# Patient Record
Sex: FEMALE | Race: WHITE | NOT HISPANIC OR LATINO | Employment: FULL TIME | URBAN - METROPOLITAN AREA
[De-identification: names, ages, dates, MRNs, and addresses within clinical notes are randomized per-mention and may not be internally consistent; named-entity substitution may affect disease eponyms.]

---
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
## 2021-02-01 NOTE — OP NOTE
Surgeon / Clinician: Rafal More MD    PREOPERATIVE DIAGNOSIS:  Bilateral symptomatic macromastia.    POSTOPERATIVE DIAGNOSIS:  Bilateral symptomatic macromastia.    PROCEDURE:  Bilateral breast reduction.    SURGEON:  Rafal More MD.    ANESTHESIA:  General.    COMPLICATIONS:  None.    INDICATION FOR PROCEDURE:  The patient is an 17-year-old female with chronic back, neck and shoulder discomfort secondary to large breasts.  She desires a bilateral reduction mammaplasty.  I discussed with her preoperatively the risks which include but are not exclusive to bleeding, infection, nipple sensitivity changes, asymmetry, wound healing complications, abnormal pathology, dissatisfaction with final size and shape and scarring.  She understands this and wishes to proceed.    PROCEDURE:  The patient was marked with the standard Zazueta pattern.  She was taken to the operating room and placed supine.  After a smooth induction of general anesthesia, she was prepped and draped sterilely.  I began on the right-hand side.  I injected 0.25% Marcaine with epinephrine along all incisions.  All incisions were then scored.  A medial-based pedicle is developed by de-epithelization and dissection to chest wall leaving a broad vascular base.  The appropriate tissue inferolaterally and superiorly was resected.  Hemostasis was achieved with electrocautery.  A drain was placed in the bed and brought out through the incision and sutured in place.  The skin flaps were rotated together and the pedicle was rotated into position and all were held in place with interrupted 2-0 Vicryl's, interrupted 3-0 Monocryl's.  Total weight resected is 1070 grams.  A mirror procedure was done contralaterally on the left breast, which is the slightly smaller breast and this weighed 920 grams.  She had excellent shape and symmetry.  Final closure was performed with 3-0 and 4-0 Monocryl sutures and Steri-strips were placed.  A compressive garnet was  placed.  She was awakened from anesthesia and taken to recovery in good condition.    ESTIMATED BLOOD LOSS:  75 mL.    COMPLICATIONS:  None.        Rafal More MD    D:  07/11/2017 15:27 T:  07/13/2017 21:31  Document:  [id number] MP\TD   Josh

## 2025-05-13 ENCOUNTER — OFFICE VISIT (OUTPATIENT)
Dept: URGENT CARE | Facility: URGENT CARE | Age: 25
End: 2025-05-13

## 2025-05-13 ENCOUNTER — HOSPITAL ENCOUNTER (EMERGENCY)
Facility: CLINIC | Age: 25
Discharge: HOME | End: 2025-05-13
Attending: STUDENT IN AN ORGANIZED HEALTH CARE EDUCATION/TRAINING PROGRAM | Admitting: STUDENT IN AN ORGANIZED HEALTH CARE EDUCATION/TRAINING PROGRAM
Payer: COMMERCIAL

## 2025-05-13 ENCOUNTER — APPOINTMENT (OUTPATIENT)
Dept: CT IMAGING | Facility: CLINIC | Age: 25
End: 2025-05-13
Attending: STUDENT IN AN ORGANIZED HEALTH CARE EDUCATION/TRAINING PROGRAM
Payer: COMMERCIAL

## 2025-05-13 VITALS
HEART RATE: 104 BPM | HEIGHT: 68 IN | DIASTOLIC BLOOD PRESSURE: 83 MMHG | RESPIRATION RATE: 23 BRPM | WEIGHT: 196 LBS | OXYGEN SATURATION: 98 % | TEMPERATURE: 97.3 F | BODY MASS INDEX: 29.7 KG/M2 | SYSTOLIC BLOOD PRESSURE: 116 MMHG

## 2025-05-13 VITALS
BODY MASS INDEX: 29.7 KG/M2 | HEART RATE: 88 BPM | RESPIRATION RATE: 17 BRPM | SYSTOLIC BLOOD PRESSURE: 113 MMHG | WEIGHT: 196 LBS | DIASTOLIC BLOOD PRESSURE: 81 MMHG | HEIGHT: 68 IN | TEMPERATURE: 97.3 F | OXYGEN SATURATION: 99 %

## 2025-05-13 DIAGNOSIS — R51.9 ACUTE NONINTRACTABLE HEADACHE, UNSPECIFIED HEADACHE TYPE: ICD-10-CM

## 2025-05-13 DIAGNOSIS — R55 TRANSIENT LOSS OF CONSCIOUSNESS: ICD-10-CM

## 2025-05-13 DIAGNOSIS — R56.9 SEIZURE-LIKE ACTIVITY (H): ICD-10-CM

## 2025-05-13 DIAGNOSIS — R11.2 NAUSEA AND VOMITING, UNSPECIFIED VOMITING TYPE: ICD-10-CM

## 2025-05-13 DIAGNOSIS — R55 SYNCOPE, UNSPECIFIED SYNCOPE TYPE: Primary | ICD-10-CM

## 2025-05-13 DIAGNOSIS — R42 LIGHTHEADEDNESS: ICD-10-CM

## 2025-05-13 LAB
ALBUMIN SERPL BCG-MCNC: 4.5 G/DL (ref 3.5–5.2)
ALBUMIN UR-MCNC: 50 MG/DL
ALP SERPL-CCNC: 60 U/L (ref 40–150)
ALT SERPL W P-5'-P-CCNC: 19 U/L (ref 0–50)
AMORPH CRY #/AREA URNS HPF: ABNORMAL /HPF
ANION GAP SERPL CALCULATED.3IONS-SCNC: 13 MMOL/L (ref 7–15)
APPEARANCE UR: ABNORMAL
AST SERPL W P-5'-P-CCNC: 21 U/L (ref 0–45)
BACTERIA #/AREA URNS HPF: ABNORMAL /HPF
BASOPHILS # BLD AUTO: 0.1 10E3/UL (ref 0–0.2)
BASOPHILS NFR BLD AUTO: 0 %
BILIRUB SERPL-MCNC: 0.5 MG/DL
BILIRUB UR QL STRIP: NEGATIVE
BUN SERPL-MCNC: 9.7 MG/DL (ref 6–20)
CALCIUM SERPL-MCNC: 8.9 MG/DL (ref 8.8–10.4)
CHLORIDE SERPL-SCNC: 104 MMOL/L (ref 98–107)
COLOR UR AUTO: YELLOW
CREAT SERPL-MCNC: 0.92 MG/DL (ref 0.51–0.95)
EGFRCR SERPLBLD CKD-EPI 2021: 88 ML/MIN/1.73M2
EOSINOPHIL # BLD AUTO: 0.1 10E3/UL (ref 0–0.7)
EOSINOPHIL NFR BLD AUTO: 1 %
ERYTHROCYTE [DISTWIDTH] IN BLOOD BY AUTOMATED COUNT: 12.5 % (ref 10–15)
GLUCOSE SERPL-MCNC: 90 MG/DL (ref 70–99)
GLUCOSE UR STRIP-MCNC: NEGATIVE MG/DL
HCG SERPL QL: NEGATIVE
HCO3 SERPL-SCNC: 20 MMOL/L (ref 22–29)
HCT VFR BLD AUTO: 39.9 % (ref 35–47)
HGB BLD-MCNC: 14.1 G/DL (ref 11.7–15.7)
HGB UR QL STRIP: ABNORMAL
HYALINE CASTS: 12 /LPF
IMM GRANULOCYTES # BLD: 0 10E3/UL
IMM GRANULOCYTES NFR BLD: 0 %
KETONES UR STRIP-MCNC: 20 MG/DL
LEUKOCYTE ESTERASE UR QL STRIP: NEGATIVE
LYMPHOCYTES # BLD AUTO: 1.4 10E3/UL (ref 0.8–5.3)
LYMPHOCYTES NFR BLD AUTO: 12 %
MCH RBC QN AUTO: 29.7 PG (ref 26.5–33)
MCHC RBC AUTO-ENTMCNC: 35.3 G/DL (ref 31.5–36.5)
MCV RBC AUTO: 84 FL (ref 78–100)
MONOCYTES # BLD AUTO: 0.4 10E3/UL (ref 0–1.3)
MONOCYTES NFR BLD AUTO: 3 %
MUCOUS THREADS #/AREA URNS LPF: PRESENT /LPF
NEUTROPHILS # BLD AUTO: 9.8 10E3/UL (ref 1.6–8.3)
NEUTROPHILS NFR BLD AUTO: 83 %
NITRATE UR QL: NEGATIVE
NRBC # BLD AUTO: 0 10E3/UL
NRBC BLD AUTO-RTO: 0 /100
PH UR STRIP: 5.5 [PH] (ref 5–7)
PLATELET # BLD AUTO: 289 10E3/UL (ref 150–450)
POTASSIUM SERPL-SCNC: 3.8 MMOL/L (ref 3.4–5.3)
PROT SERPL-MCNC: 7.4 G/DL (ref 6.4–8.3)
RBC # BLD AUTO: 4.75 10E6/UL (ref 3.8–5.2)
RBC URINE: 2 /HPF
SODIUM SERPL-SCNC: 137 MMOL/L (ref 135–145)
SP GR UR STRIP: 1.02 (ref 1–1.03)
SQUAMOUS EPITHELIAL: <1 /HPF
UROBILINOGEN UR STRIP-MCNC: NORMAL MG/DL
WBC # BLD AUTO: 11.8 10E3/UL (ref 4–11)
WBC URINE: 2 /HPF

## 2025-05-13 PROCEDURE — 99204 OFFICE O/P NEW MOD 45 MIN: CPT | Performed by: PHYSICIAN ASSISTANT

## 2025-05-13 PROCEDURE — 3074F SYST BP LT 130 MM HG: CPT | Performed by: PHYSICIAN ASSISTANT

## 2025-05-13 PROCEDURE — 93000 ELECTROCARDIOGRAM COMPLETE: CPT | Performed by: PHYSICIAN ASSISTANT

## 2025-05-13 PROCEDURE — 84703 CHORIONIC GONADOTROPIN ASSAY: CPT | Performed by: STUDENT IN AN ORGANIZED HEALTH CARE EDUCATION/TRAINING PROGRAM

## 2025-05-13 PROCEDURE — 81001 URINALYSIS AUTO W/SCOPE: CPT | Performed by: STUDENT IN AN ORGANIZED HEALTH CARE EDUCATION/TRAINING PROGRAM

## 2025-05-13 PROCEDURE — 85025 COMPLETE CBC W/AUTO DIFF WBC: CPT | Performed by: STUDENT IN AN ORGANIZED HEALTH CARE EDUCATION/TRAINING PROGRAM

## 2025-05-13 PROCEDURE — 3079F DIAST BP 80-89 MM HG: CPT | Performed by: PHYSICIAN ASSISTANT

## 2025-05-13 PROCEDURE — 99284 EMERGENCY DEPT VISIT MOD MDM: CPT | Mod: 25

## 2025-05-13 PROCEDURE — 82247 BILIRUBIN TOTAL: CPT | Performed by: STUDENT IN AN ORGANIZED HEALTH CARE EDUCATION/TRAINING PROGRAM

## 2025-05-13 PROCEDURE — 36415 COLL VENOUS BLD VENIPUNCTURE: CPT | Performed by: STUDENT IN AN ORGANIZED HEALTH CARE EDUCATION/TRAINING PROGRAM

## 2025-05-13 PROCEDURE — 70450 CT HEAD/BRAIN W/O DYE: CPT

## 2025-05-13 RX ORDER — DESOGESTREL AND ETHINYL ESTRADIOL 0.15-0.03
1 KIT ORAL DAILY
COMMUNITY
Start: 2025-05-13

## 2025-05-13 RX ORDER — ALPRAZOLAM 2 MG/1
2 TABLET ORAL 3 TIMES DAILY PRN
COMMUNITY

## 2025-05-13 ASSESSMENT — COLUMBIA-SUICIDE SEVERITY RATING SCALE - C-SSRS
2. HAVE YOU ACTUALLY HAD ANY THOUGHTS OF KILLING YOURSELF IN THE PAST MONTH?: NO
6. HAVE YOU EVER DONE ANYTHING, STARTED TO DO ANYTHING, OR PREPARED TO DO ANYTHING TO END YOUR LIFE?: NO
1. IN THE PAST MONTH, HAVE YOU WISHED YOU WERE DEAD OR WISHED YOU COULD GO TO SLEEP AND NOT WAKE UP?: NO

## 2025-05-13 NOTE — PROGRESS NOTES
"  ASSESSMENT/PLAN:    (R55) Syncope, unspecified syncope type  (primary encounter diagnosis)    MDM Unwitnessed syncopal , estimated to be 10 to 15 minutes in total duration by patient--followed by nausea, cyclical non-bloody vomiting, generalized headache, lightheadedness (without vertigo), and  \"brain fog\"/feels cognitively \"slower\" of uncertain etiology.  As her event was unwitnessed, seizure cannot be excluded.  Other emergent neurologic, cardiac etiologies are possible.  Electrolyte disturbances are in the differential.  Patient was alert and in no acute distress during her short urgent care visit.  ECG showed normal sinus rhythm 98 bpm.  No evidence of acute ischemic changes or ectopy on my impression.  No focal neurologic deficits on exam. I advised a rapid glucose and hemoglobin check on arrival.  Patient declined lab testing here/states she has needle phobia.    Patient is advised she should be seen in the emergency department now for further evaluation.  She verbalizes understanding of and agrees to this plan.  Patient does have an adult  to take her directly from the urgent care to an emergency department.  Please also see the below discharge summary/plan (which I reviewed with patient verbally today and provided in printed form to hand carry to the emergency department to assist in continuity of care).    Plan: EKG 12-lead complete w/read - Clinics            AVS/Plan-    May 13, 2025 Urgent Care Plan:       I have advised you have your adult  take you directly from our urgent care to the emergency room now for further evaluation of your unwitnessed syncope (fainting episode) that you had at work today, lasting an estimated 10-15 minutes by your report (approximately 2 PM).--Followed by cyclical vomiting, headache, brain fog, lightheadedness and tiredness.      You should be wheeled from your car to the emergency department door.  Please do not attempt to walk while you are feeling this way.  " "    I provided a copy of your heart tracing (also called the ECG) for you to hand carry to the emergency room now.    As per your desire/because you do not like needles, no lab testing was done during her short urgent care visit.    (R51.9) Acute nonintractable headache, unspecified headache type  Plan: EKG 12-lead complete w/read - Clinics            (R11.2) Nausea and vomiting, unspecified vomiting type  Plan: EKG 12-lead complete w/read - Clinics            (R42) Lightheadedness  Plan: EKG 12-lead complete w/read - Clinics         This progress note has been dictated, with use of voice recognition software. Any grammatical, typographical, or context errors are unintentional and inherent to use of voice recognition software.  -------------    Chief Complaint   Patient presents with    Urgent Care     Passed out at work today-did not hit head (unsure) R jaw pain is worse than L       SUBJECTIVE:     Karissa Amato is a 25 year old female who presents to  today for evaluation of unexplained syncopal event at work today.    HPI: Patient tells me she was sitting at her office desk, behind a closed office door at approximately 2 PM today when she fainted.  Patient states, \"I do not remember what happened.  I was just sitting at my office desk and then I woke up on the floor.\"  Patient estimated the length of her loss of consciousness was approximately 10 or 15 minutes.  When she woke, she noted generalized headache, nausea and vomiting.  She vomited once at work right after the above episode and had several  episodes of vomiting during her short urgent care visit (no black or bloody vomitus).     Patient states nothing unusual occurred at work today/states she did not have any extra stress or anxiety at work today.     Associated symptoms-patient feels generally tired, lightheaded (no vertigo) and has some \"brain fog\" sensation/feels cognitively slow since the above episode.    No associated chest pain, shortness of " breath,  hemoptysis, abdominal pain or known seizure activity (although her syncopal episode was unwitnessed).      No personal history of seizures,brain aneurysm, AAA, cardiac arrhythmias or other cardiac history. No prior syncopal episodes.                     Review Of Systems  Consitutional: No fever or chills   Skin: No acute rash or hives    Eyes: Denies any visual changes   Respiratory: Denies any hx of spontaneous pneumothorax No shortness of breath, dyspnea on exertion, cough, or hemoptysis  Cardiovascular: No acute chest pain. No personal or primary family history of cardiac arrhythmias. No family history of SCD. No palpitations, tachycardia, irregular heart beat, or dyspnea on exertion  Gastrointestinal: Positive for acute nausea and cyclical non-bloody vomiting after above syncopal event. No acute abdominal pain. No known history of AAA   Genitourinary: Denies any dysuria or UTI symptoms   Neurologic: Positive for generalized headache after above syncopal event. No headache prior to syncopal event.  No seizures after patient woke from syncopal episode.  No associated neck stiffness, photophobia, vertigo, speech changes, visual changes, upper extremity or lower extremity weakness.  GYN: Patient states she takes continues dose OCP-with break to cycle about every 3 months. LMP was reportedly at expected interval. She denies any concern for pregnancy. No pelvic pain. No vaginal bleeding.   Psychiatric: No increased stress or panic attack symptoms prior  to above syncopal event   Hematologic/Lymphatic/Immunologic: Denies any personal hx of DVT/PE or blood dyscrasia   Endocrine: Denies any DM         Past Medical History:   Diagnosis Date    Large breasts      There is no problem list on file for this patient.      Current Outpatient Medications   Medication Sig Dispense Refill    ALPRAZolam (XANAX) 2 MG tablet Take 2 mg by mouth 3 times daily as needed for anxiety (medical phobia).      desogestrel-ethinyl  "estradiol (APRI) 0.15-30 MG-MCG tablet Take 1 tablet by mouth daily.       No current facility-administered medications for this visit.         Allergies   Allergen Reactions    Propranolol Hcl Shortness Of Breath       OBJECTIVE:  /83   Pulse 104   Temp 97.3  F (36.3  C) (Tympanic)   Resp 23   Ht 1.727 m (5' 8\")   Wt 88.9 kg (196 lb)   LMP 03/24/2025 (Approximate)   SpO2 98%   BMI 29.80 kg/m        General appearance: alert and no apparent distress  Skin color is uniform in color and without systemic rash or hives  HEENT:   Head is atraumatic   Conjunctiva not injected.  Sclera clear.  Oropharyngeal exam: Patient states she has jaw tenderness, bilaterally, on exam. She is able to open and close mouth.  No tongue, mouth, or throat swelling. no lesions, erythema, NECK: Trachea is midline  CARDIAC:NORMAL - regular rate and rhythm without murmur.  RESP:  Normal - CTA without rales, rhonchi, or wheezing. Good breath sounds into all listening areas today   ABDOMEN: Abdomen soft, non-tender. BS normal. No masses, organomegaly. No CVA tenderness  EXTREMITIES:  No asymmetry. Lower legs are equally symmetrical bilaterally. No redness, swelling, heat or pain of lower extremities.   NEURO:  PERRLA.  Alert and oriented.  Undilated fundoscopic exam within normal limits. CN II/XII grossly intact. UE/LE strength, DTR's and sensation to light touch good and equal bilaterally.  Normal finger-to-nose testing.         Today's ECG:  normal sinus rhythm 98 bpm.  No evidence of acute ischemic change on my impression.  No ectopy.                                "

## 2025-05-13 NOTE — ED TRIAGE NOTES
Pt referred to this ED from  after a syncopal episode. Pt was at work today at her desk and lost consciousness for 10-15 minutes, regained consciousness on the floor. Since then has been experiencing vomiting, brain fog, lightheadedness, and tiredness. States she took 2mg of xanax pta because she is afraid of needles.     Triage Assessment (Adult)       Row Name 05/13/25 4010          Triage Assessment    Airway WDL WDL        Respiratory WDL    Respiratory WDL WDL        Skin Circulation/Temperature WDL    Skin Circulation/Temperature WDL WDL        Cardiac WDL    Cardiac WDL WDL        Peripheral/Neurovascular WDL    Peripheral Neurovascular WDL WDL        Cognitive/Neuro/Behavioral WDL    Cognitive/Neuro/Behavioral WDL WDL

## 2025-05-13 NOTE — PROGRESS NOTES
Urgent Care Clinic Visit  Rapid rooming was initiated.  Provider was consulted, patient will remain in room and provider will be with patient as soon as possible.  Chief Complaint   Patient presents with    Urgent Care     Passed out at work today-did not hit head (unsure) R jaw pain is worse than L               5/13/2025     3:36 PM   Additional Questions   Roomed by Roberta   Accompanied by Aleah (co-worker)

## 2025-05-13 NOTE — PATIENT INSTRUCTIONS
May 13, 2025 Urgent Care Plan:       I have advised you have your adult  take you directly from our urgent care to the emergency room now for further evaluation of your unwitnessed syncope (fainting episode) that you had at work today, lasting an estimated 10-15 minutes by your report (approximately 2 PM).--Followed by cyclical vomiting, headache, brain fog, lightheadedness and tiredness.      You should be wheeled from your car to the emergency department door.  Please do not attempt to walk while you are feeling this way.      I provided a copy of your heart tracing (also called the ECG) for you to hand carry to the emergency room now.    As per your desire/because you do not like needles, no lab testing was done during her short urgent care visit.

## 2025-05-14 ENCOUNTER — PATIENT OUTREACH (OUTPATIENT)
Dept: CARE COORDINATION | Facility: CLINIC | Age: 25
End: 2025-05-14
Payer: COMMERCIAL

## 2025-05-14 NOTE — DISCHARGE INSTRUCTIONS
Exactly what happened earlier today is not entirely clear.  It is possible you had a fainting episode or perhaps a seizure.  The workup here in the emergency department today was overall reassuring.  Your CT scan did not show any abnormalities in your head or brain and your blood work was also reassuring.  I would continue to monitor for symptoms and if you have any recurrent loss of consciousness particularly if it is associated with repetitive rhythmic jerking of the arms or legs and a prolonged period of confusion afterwards this would be more consistent with a seizure and a reason to have a repeat evaluation in the emergency department.  Otherwise I did place a referral for neurology.  They will usually contact you in a few days to schedule a follow-up appointment.

## 2025-05-14 NOTE — ED PROVIDER NOTES
EMERGENCY DEPARTMENT ENCOUNTER      NAME: Karissa Amato  AGE: 25 year old female  YOB: 2000  MRN: [id number]  EVALUATION DATE & TIME: No admission date for patient encounter.    PCP: Ebony Craig    ED PROVIDER: Thomas Renteria MD      Chief Complaint   Patient presents with    Loss of Consciousness         FINAL IMPRESSION:  1. Transient loss of consciousness    2. Seizure-like activity (H)          ED COURSE & MEDICAL DECISION MAKING:    Pertinent Labs & Imaging studies reviewed. (See chart for details)  25 year old female presents to the Emergency Department for evaluation of loss of consciousness    ED Course as of 05/13/25 2238   Tue May 13, 2025   2103 Patient is a 25-year-old female with history of anxiety currently on bupropion and alprazolam who presents emergency department for evaluation of transient loss of consciousness.  Patient was at work today when she woke up on the ground is not really certain what happened.  Thinks she may have been on the ground for 10 to 15 minutes.  No prodromal symptoms.   remember any presyncope/loss of consciousness chest pain, shortness of breath or headache.  She woke up on the ground with little bit of a headache which is since resolved.  No known history of seizure disorder.  No family history.  Denies any chest pain currently.    Exam patient is well-appearing no acute distress.  Hemodynamically stable and afebrile.  Saturating well room air.  She is awake alert and oriented answers questions appropriately.  Speech, no facial droop, symmetric strength in upper lower extremities bilaterally, no meningismus.  No midline C-spine tenderness on exam.  Unclear etiology of her loss of consciousness.  Suspicious for possible syncope versus seizure activity.      Given what patient endorses as about a 10 to 15-minute loss of consciousness more suspicious for possible seizure and postictal period although he did not witnesses to the event.  She denies tongue  biting or loss of control or bowel or bladder.  Will obtain EKG, basic metabolic workup and CT of the head.       2104 Labs reviewed inter myself.  CBC shows slight leukocytosis but no significant anemia.  Reassuring electrolytes.  UA shows few bacteria but no nitrites and patient is denying any urinary symptoms.  Urine pregnancy negative   2106 Im able to see EKG obtained at urgent care today which shows a sinus rhythm 98 beats minute, normal axis, normal [place], QRS and QTc durations, no ST elevations, no concerning interval prolongation there is signs of Brugada.   2207 Head CT does not show any acute intracranial abnormality.   2232 Upon repeat evaluation patient remains awake alert and oriented and interacting appropriately.  Exactly what happened earlier today remains unclear as this was not witnessed.  Could potentially be a seizure although there is nothing definitively diagnostic.  Discussed with patient plan for outpatient follow-up with neurology.  She is currently on Wellbutrin however without any way to definitively diagnose seizure today I think it is reasonable for her to continue this as she has been on it for several years without any issue.  Recommend she continue to closely monitor symptoms and if she has any recurrent loss of consciousness she should have a low threshold for returning to the emergency department.  Otherwise discussed with her that she should avoid potentially risky activities if she were to have another transient loss of consciousness such as swimming alone.  Patient expresses understanding and otherwise feels comfortable with discharge home.  Discharged stable condition.     8:54 PM I met and evaluated the patient.         Medical Decision Making    Discharge. No recommendations on prescription strength medication(s). I considered admission, but discharged patient after significant clinical improvement.    MIPS (CTPE, Dental pain, Mishra, Sinusitis, Asthma/COPD, Head Trauma): Not  Applicable    SEPSIS:            At the conclusion of the encounter I discussed the results of all of the tests and the disposition. The questions were answered. The patient or family acknowledged understanding and was agreeable with the care plan.     0 minutes of critical care time     MEDICATIONS GIVEN IN THE EMERGENCY:  Medications - No data to display    NEW PRESCRIPTIONS STARTED AT TODAY'S ER VISIT  New Prescriptions    No medications on file          =================================================================    HPI    Patient information was obtained from: patient and family.     Use of : N/A         Karissa Amato is a 25 year old female with a pertinent history of asthma, ARLIN, who presents to this ED by walk-in for evaluation of syncope.     Patient presents to the ED for concerns of loss of consciousness that occurred at 2:00 PM today. She states that she was at work, sitting at her desk, and the next thing she remembers is that she was on the ground. She did not experience any symptoms prior to the syncope. The door to her office was closed and the episode was unwitnessed. She suspects that she was unconscious for about 10-15 minutes. She denies history of seizures. This has not happened to her before. She endorses jaw pain after the event. She had a headache earlier but this resolved. She states that she takes Bupropion and Isibloom. She took 2 mg Zanax on the way here. She denies recent medication changes. She denies family history of seizures.     Patient denies any chest pain, nausea, and lightheadedness, neck pain, back pain, tongue pain, and abdominal pain. Patient states no other complaints or concerns at this time.       REVIEW OF SYSTEMS   Refer to the \[facility] [place] [facility]\""    PAST MEDICAL HISTORY:  Past Medical History:   Diagnosis Date    Large breasts        PAST SURGICAL HISTORY:  Past Surgical History:   Procedure Laterality Date    EXCISION BENIGN LESION COMPL      FRENULECTOMY, LINGUAL       MAMMOPLASTY REDUCTION BILATERAL Bilateral 7/11/2017    Procedure: MAMMOPLASTY REDUCTION BILATERAL;  BILATERAL REDUCTION MAMMOPLASTY;  Surgeon: Rafal More MD;  Location: [facility]    SOFT TISSUE SURGERY      TUMOR REMOVAL      from back           CURRENT MEDICATIONS:    ALPRAZolam (XANAX) 2 MG tablet  desogestrel-ethinyl estradiol (APRI) 0.15-30 MG-MCG tablet        ALLERGIES:  Allergies   Allergen Reactions    Propranolol Hcl Shortness Of Breath       FAMILY HISTORY:  No family history on file.    SOCIAL HISTORY:   Social History     Socioeconomic History    Marital status: Single   Tobacco Use    Smoking status: Never   Substance and Sexual Activity    Alcohol use: Yes     Comment: 2/week    Drug use: Yes     Types: Marijuana     Comment: past week     Social Drivers of Health     Financial Resource Strain: Medium Risk (4/30/2025)    [facility]    Financial Resource Strain     Difficulty of Paying Living Expenses: 1     Difficulty of Paying Living Expenses: 2   Food Insecurity: No Food Insecurity (4/30/2025)    [facility]    Food Insecurity     Do you worry your food will run out before you are able to buy more?: 1   Transportation Needs: No Transportation Needs (4/30/2025)    Received from [facility]    Transportation Needs     Does lack of transportation keep you from medical appointments?: 1     Does lack of transportation keep you from work, meetings or getting things that you need?: 1   Social Connections: Socially Integrated (4/30/2025)    [facility] from [facility]    Social Connections     Do you often feel lonely or isolated from those around you?: 0   Housing Stability: Low Risk  (4/30/2025)    Received from [facility]    Housing Stability     What is your housing situation today?: 1       VITALS:  BP  "132/68   Pulse 101   Temp 97.3  F (36.3  C) (Temporal)   Resp 16   Ht 1.727 m (5' 8\")   Wt 88.9 kg (196 lb)   LMP 03/24/2025 (Approximate)   SpO2 99%   BMI 29.80 kg/m      PHYSICAL EXAM    Constitutional: Well developed, Well nourished, NAD,   HENT: Normocephalic, Atraumatic,  mucous membranes moist,   Neck- trachea midline, No stridor.    Eyes:EOMI, Conjunctiva normal, No discharge.   Respiratory: Normal breath sounds, No respiratory distress, No wheezing.    Cardiovascular: Normal heart rate, Regular rhythm,  No murmurs,   Abdominal: Soft, No tenderness, No rebound or guarding.     Musculoskeletal: no deformity or malalignment   Integument: Warm, Dry, No erythema,    Neurologic: Alert & oriented x 3 , questions appropriate, no facial droop, symmetric strength in upper lower extremities bilaterally, no meningismus  Psychiatric: Affect normal, Cooperative.      LAB:  All pertinent labs reviewed and interpreted.  Results for orders placed or performed during the hospital encounter of 05/13/25   Head CT w/o contrast    Impression    IMPRESSION:  1.  Normal head CT.   UA with Microscopic reflex to Culture    Specimen: Urine, Clean Catch   Result Value Ref Range    Color Urine Yellow Colorless, Straw, Light Yellow, Yellow    Appearance Urine Cloudy (A) Clear    Glucose Urine Negative Negative mg/dL    Bilirubin Urine Negative Negative    Ketones Urine 20 (A) Negative mg/dL    Specific Gravity Urine 1.025 1.001 - 1.030    Blood Urine 0.03 mg/dL (A) Negative    pH Urine 5.5 5.0 - 7.0    Protein Albumin Urine 50 (A) Negative mg/dL    Urobilinogen Urine Normal Normal mg/dL    Nitrite Urine Negative Negative    Leukocyte Esterase Urine Negative Negative    Bacteria Urine Moderate (A) None Seen /HPF    Mucus Urine Present (A) None Seen /LPF    Amorphous Crystals Urine Few (A) None Seen /HPF    RBC Urine 2 <=2 /HPF    WBC Urine 2 <=5 /HPF    Squamous Epithelials Urine <1 <=1 /HPF    Hyaline Casts Urine 12 (H) <=2 /LPF "   Comprehensive metabolic panel   Result Value Ref Range    Sodium 137 135 - 145 mmol/L    Potassium 3.8 3.4 - 5.3 mmol/L    Carbon Dioxide (CO2) 20 (L) 22 - 29 mmol/L    Anion Gap 13 7 - 15 mmol/L    Urea Nitrogen 9.7 6.0 - 20.0 mg/dL    Creatinine 0.92 0.51 - 0.95 mg/dL    GFR Estimate 88 >60 mL/min/1.73m2    Calcium 8.9 8.8 - 10.4 mg/dL    Chloride 104 98 - 107 mmol/L    Glucose 90 70 - 99 mg/dL    Alkaline Phosphatase 60 40 - 150 U/L    AST 21 0 - 45 U/L    ALT 19 0 - 50 U/L    Protein Total 7.4 6.4 - 8.3 g/dL    Albumin 4.5 3.5 - 5.2 g/dL    Bilirubin Total 0.5 <=1.2 mg/dL   HCG QUALitative pregnancy (blood)   Result Value Ref Range    hCG Serum Qualitative Negative Negative   CBC with platelets and differential   Result Value Ref Range    WBC Count 11.8 (H) 4.0 - 11.0 10e3/uL    RBC Count 4.75 3.80 - 5.20 10e6/uL    Hemoglobin 14.1 11.7 - 15.7 g/dL    Hematocrit 39.9 35.0 - 47.0 %    MCV 84 78 - 100 fL    MCH 29.7 26.5 - 33.0 pg    MCHC 35.3 31.5 - 36.5 g/dL    RDW 12.5 10.0 - 15.0 %    Platelet Count 289 150 - 450 10e3/uL    % Neutrophils 83 %    % Lymphocytes 12 %    % Monocytes 3 %    % Eosinophils 1 %    % Basophils 0 %    % Immature Granulocytes 0 %    NRBCs per 100 WBC 0 <1 /100    Absolute Neutrophils 9.8 (H) 1.6 - 8.3 10e3/uL    Absolute Lymphocytes 1.4 0.8 - 5.3 10e3/uL    Absolute Monocytes 0.4 0.0 - 1.3 10e3/uL    Absolute Eosinophils 0.1 0.0 - 0.7 10e3/uL    Absolute Basophils 0.1 0.0 - 0.2 10e3/uL    Absolute Immature Granulocytes 0.0 <=0.4 10e3/uL    Absolute NRBCs 0.0 10e3/uL       RADIOLOGY:  Reviewed all pertinent imaging. Please see official radiology report.  Head CT w/o contrast   Final Result   IMPRESSION:   1.  Normal head CT.          EKG:        PROCEDURES:         [employer] Documentation:   CMS Diagnoses:              IJuan, am serving as a scribe to document services personally performed by Thomas Rentreia MD based on my observation and the provider's statements to  me. I, Thomas Renteria MD, attest that Juan Sewell is acting in a scribe capacity, has observed my performance of the services and has documented them in accordance with my direction.    Thomas Renteria MD  [facility] EMERGENCY ROOM  [address] [address]  [phone]      Thomas Renteria MD  05/13/25 5716

## 2025-05-19 ENCOUNTER — TELEPHONE (OUTPATIENT)
Dept: NEUROLOGY | Facility: CLINIC | Age: 25
End: 2025-05-19

## 2025-05-19 NOTE — TELEPHONE ENCOUNTER
Was your seizure or loss of consciousness related to substance abuse or alcohol use?   No - Is this your first seizure? Yes - Are you over the age of 60? No - Schedule with a general neurologist

## 2025-05-20 NOTE — TELEPHONE ENCOUNTER
RECORDS RECEIVED FROM: internal   REASON FOR VISIT: seizures   PROVIDER: Dr. Mazariegos   DATE OF APPT: 7/8/25   NOTES (FOR ALL VISITS) STATUS DETAILS   OFFICE NOTE from referring provider Internal SEE INPATIENT NOTES   DISCHARGE REPORT from the ER Internal [facility]:  5/13/25    [facility]:  5/13/25   MEDICATION LIST Internal    IMAGING  (FOR ALL VISITS)     CT (HEAD, NECK, SPINE) Internal [facility]:  CT Head 5/13/25

## 2025-05-24 ENCOUNTER — APPOINTMENT (OUTPATIENT)
Dept: CT IMAGING | Facility: CLINIC | Age: 25
End: 2025-05-24
Attending: EMERGENCY MEDICINE
Payer: COMMERCIAL

## 2025-05-24 ENCOUNTER — HOSPITAL ENCOUNTER (EMERGENCY)
Facility: CLINIC | Age: 25
Discharge: HOME | End: 2025-05-25
Attending: EMERGENCY MEDICINE | Admitting: EMERGENCY MEDICINE
Payer: COMMERCIAL

## 2025-05-24 DIAGNOSIS — S00.83XA FACIAL CONTUSION, INITIAL ENCOUNTER: ICD-10-CM

## 2025-05-24 DIAGNOSIS — R40.4 UNRESPONSIVE EPISODE: ICD-10-CM

## 2025-05-24 DIAGNOSIS — R00.0 SINUS TACHYCARDIA: ICD-10-CM

## 2025-05-24 DIAGNOSIS — S40.022A ARM BRUISE, LEFT, INITIAL ENCOUNTER: ICD-10-CM

## 2025-05-24 LAB
ANION GAP SERPL CALCULATED.3IONS-SCNC: 14 MMOL/L (ref 7–15)
BASOPHILS # BLD AUTO: 0.1 10E3/UL (ref 0–0.2)
BASOPHILS NFR BLD AUTO: 1 %
BUN SERPL-MCNC: 8 MG/DL (ref 6–20)
CALCIUM SERPL-MCNC: 8.8 MG/DL (ref 8.8–10.4)
CHLORIDE SERPL-SCNC: 103 MMOL/L (ref 98–107)
CREAT SERPL-MCNC: 0.94 MG/DL (ref 0.51–0.95)
EGFRCR SERPLBLD CKD-EPI 2021: 85 ML/MIN/1.73M2
EOSINOPHIL # BLD AUTO: 0 10E3/UL (ref 0–0.7)
EOSINOPHIL NFR BLD AUTO: 0 %
ERYTHROCYTE [DISTWIDTH] IN BLOOD BY AUTOMATED COUNT: 12.4 % (ref 10–15)
GLUCOSE SERPL-MCNC: 98 MG/DL (ref 70–99)
HCG SERPL QL: NEGATIVE
HCO3 SERPL-SCNC: 21 MMOL/L (ref 22–29)
HCT VFR BLD AUTO: 41.5 % (ref 35–47)
HGB BLD-MCNC: 14.2 G/DL (ref 11.7–15.7)
HOLD SPECIMEN: NORMAL
IMM GRANULOCYTES # BLD: 0.1 10E3/UL
IMM GRANULOCYTES NFR BLD: 0 %
LYMPHOCYTES # BLD AUTO: 1.7 10E3/UL (ref 0.8–5.3)
LYMPHOCYTES NFR BLD AUTO: 11 %
MCH RBC QN AUTO: 29.5 PG (ref 26.5–33)
MCHC RBC AUTO-ENTMCNC: 34.2 G/DL (ref 31.5–36.5)
MCV RBC AUTO: 86 FL (ref 78–100)
MONOCYTES # BLD AUTO: 0.5 10E3/UL (ref 0–1.3)
MONOCYTES NFR BLD AUTO: 3 %
NEUTROPHILS # BLD AUTO: 13.5 10E3/UL (ref 1.6–8.3)
NEUTROPHILS NFR BLD AUTO: 85 %
NRBC # BLD AUTO: 0 10E3/UL
NRBC BLD AUTO-RTO: 0 /100
PLATELET # BLD AUTO: 289 10E3/UL (ref 150–450)
POTASSIUM SERPL-SCNC: 3.8 MMOL/L (ref 3.4–5.3)
RBC # BLD AUTO: 4.82 10E6/UL (ref 3.8–5.2)
SODIUM SERPL-SCNC: 138 MMOL/L (ref 135–145)
TROPONIN T SERPL HS-MCNC: <6 NG/L
WBC # BLD AUTO: 15.8 10E3/UL (ref 4–11)

## 2025-05-24 PROCEDURE — 80048 BASIC METABOLIC PNL TOTAL CA: CPT | Performed by: EMERGENCY MEDICINE

## 2025-05-24 PROCEDURE — 84703 CHORIONIC GONADOTROPIN ASSAY: CPT | Performed by: EMERGENCY MEDICINE

## 2025-05-24 PROCEDURE — 99285 EMERGENCY DEPT VISIT HI MDM: CPT | Mod: 25

## 2025-05-24 PROCEDURE — 85379 FIBRIN DEGRADATION QUANT: CPT | Performed by: EMERGENCY MEDICINE

## 2025-05-24 PROCEDURE — 85025 COMPLETE CBC W/AUTO DIFF WBC: CPT | Performed by: EMERGENCY MEDICINE

## 2025-05-24 PROCEDURE — 84484 ASSAY OF TROPONIN QUANT: CPT | Performed by: EMERGENCY MEDICINE

## 2025-05-24 PROCEDURE — 93005 ELECTROCARDIOGRAM TRACING: CPT

## 2025-05-24 PROCEDURE — 36415 COLL VENOUS BLD VENIPUNCTURE: CPT | Performed by: EMERGENCY MEDICINE

## 2025-05-24 PROCEDURE — 96360 HYDRATION IV INFUSION INIT: CPT | Mod: 59

## 2025-05-24 PROCEDURE — 70450 CT HEAD/BRAIN W/O DYE: CPT

## 2025-05-24 PROCEDURE — 258N000003 HC RX IP 258 OP 636: Performed by: EMERGENCY MEDICINE

## 2025-05-24 RX ADMIN — SODIUM CHLORIDE 1000 ML: 0.9 INJECTION, SOLUTION INTRAVENOUS at 22:47

## 2025-05-24 ASSESSMENT — ACTIVITIES OF DAILY LIVING (ADL)
ADLS_ACUITY_SCORE: 41
ADLS_ACUITY_SCORE: 41

## 2025-05-24 ASSESSMENT — COLUMBIA-SUICIDE SEVERITY RATING SCALE - C-SSRS
6. HAVE YOU EVER DONE ANYTHING, STARTED TO DO ANYTHING, OR PREPARED TO DO ANYTHING TO END YOUR LIFE?: NO
1. IN THE PAST MONTH, HAVE YOU WISHED YOU WERE DEAD OR WISHED YOU COULD GO TO SLEEP AND NOT WAKE UP?: NO
2. HAVE YOU ACTUALLY HAD ANY THOUGHTS OF KILLING YOURSELF IN THE PAST MONTH?: NO

## 2025-05-25 ENCOUNTER — APPOINTMENT (OUTPATIENT)
Dept: CT IMAGING | Facility: CLINIC | Age: 25
End: 2025-05-25
Attending: EMERGENCY MEDICINE
Payer: COMMERCIAL

## 2025-05-25 VITALS
TEMPERATURE: 98.5 F | OXYGEN SATURATION: 96 % | WEIGHT: 190 LBS | DIASTOLIC BLOOD PRESSURE: 78 MMHG | HEIGHT: 68 IN | BODY MASS INDEX: 28.79 KG/M2 | SYSTOLIC BLOOD PRESSURE: 117 MMHG | HEART RATE: 100 BPM | RESPIRATION RATE: 18 BRPM

## 2025-05-25 LAB
ATRIAL RATE - MUSE: 107 BPM
D DIMER PPP FEU-MCNC: 1.41 UG/ML FEU (ref 0–0.5)
DIASTOLIC BLOOD PRESSURE - MUSE: NORMAL MMHG
INTERPRETATION ECG - MUSE: NORMAL
P AXIS - MUSE: 73 DEGREES
PR INTERVAL - MUSE: 142 MS
QRS DURATION - MUSE: 82 MS
QT - MUSE: 358 MS
QTC - MUSE: 477 MS
R AXIS - MUSE: 73 DEGREES
SYSTOLIC BLOOD PRESSURE - MUSE: NORMAL MMHG
T AXIS - MUSE: 71 DEGREES
VENTRICULAR RATE- MUSE: 107 BPM

## 2025-05-25 PROCEDURE — 71275 CT ANGIOGRAPHY CHEST: CPT

## 2025-05-25 PROCEDURE — 96361 HYDRATE IV INFUSION ADD-ON: CPT

## 2025-05-25 PROCEDURE — 250N000011 HC RX IP 250 OP 636: Performed by: EMERGENCY MEDICINE

## 2025-05-25 PROCEDURE — 250N000009 HC RX 250: Performed by: EMERGENCY MEDICINE

## 2025-05-25 RX ORDER — IOPAMIDOL 755 MG/ML
70 INJECTION, SOLUTION INTRAVASCULAR ONCE
Status: COMPLETED | OUTPATIENT
Start: 2025-05-25 | End: 2025-05-25

## 2025-05-25 RX ADMIN — SODIUM CHLORIDE 94 ML: 9 INJECTION, SOLUTION INTRAVENOUS at 00:57

## 2025-05-25 RX ADMIN — IOPAMIDOL 70 ML: 755 INJECTION, SOLUTION INTRAVENOUS at 00:57

## 2025-05-25 ASSESSMENT — ACTIVITIES OF DAILY LIVING (ADL)
ADLS_ACUITY_SCORE: 41
ADLS_ACUITY_SCORE: 41

## 2025-05-25 NOTE — ED TRIAGE NOTES
Pt was seen here a couple of weeks ago for LOC and workup was for potential new onset seizure. Has appointment with Neurology in July. Today pt reports another LOC with vomiting where she woke up on the floor. C/o headache, left cheekbone pain, and left shoulder pain.     Triage Assessment (Adult)       Row Name 05/24/25 9934          Triage Assessment    Airway WDL WDL        Respiratory WDL    Respiratory WDL WDL        Skin Circulation/Temperature WDL    Skin Circulation/Temperature WDL WDL        Cardiac WDL    Cardiac WDL WDL        Peripheral/Neurovascular WDL    Peripheral Neurovascular WDL WDL        Cognitive/Neuro/Behavioral WDL    Cognitive/Neuro/Behavioral WDL X  loss of consciousness and headache

## 2025-05-25 NOTE — DISCHARGE INSTRUCTIONS
Return if symptoms are worsening.  Avoid doing activities for which a repeat event would be dangerous such as swimming by yourself or in deep water.

## 2025-05-25 NOTE — ED PROVIDER NOTES
Emergency Department Note      History of Present Illness     Chief Complaint   Loss of Consciousness (Pt ws here a couple of weeks ago with LOC and work up was for potential seizures. Does not see Neurology until July. Today woke up on the floor and has pain in left cheekbone and left shoulder. )      MELITA   Karissa Amato is a 25 year old female presenting after an unwitnessed fall which could have been syncopal or seizure in etiology.  She remembers starting to feel dizzy and hot.  She woke up on the ground of her bedroom.  She has vomited 5 times since.  She has bruising by the left shoulder and developing on the left lateral cheekbone.  She scraped her left ankle and presumes that she fell landing on the left side.  A similar event including with headache and vomiting afterwards happened recently and she has follow-up with neurology for a possible seizure event.  She has tachycardia on arrival and denies chest pain and shortness of breath.  Denies leg swelling or calf pain.  Has been vomiting but denies associated abdominal pain.  Denies concern for pregnancy due to lack of sexual activity.  No incontinence or tongue biting.  However later in her visit she did note a small area where she bit her tongue.    Independent Historian   None    Review of External Notes   Prior visit 5/13/25    Past Medical History     Medical History and Problem List   Past Medical History:   Diagnosis Date    Large breasts        Medications   ALPRAZolam (XANAX) 2 MG tablet  desogestrel-ethinyl estradiol (APRI) 0.15-30 MG-MCG tablet        Surgical History   Past Surgical History:   Procedure Laterality Date    EXCISION BENIGN LESION COMPL      FRENULECTOMY, LINGUAL      MAMMOPLASTY REDUCTION BILATERAL Bilateral 7/11/2017    Procedure: MAMMOPLASTY REDUCTION BILATERAL;  BILATERAL REDUCTION MAMMOPLASTY;  Surgeon: Rafal More MD;  Location: [facility]    SOFT TISSUE SURGERY      TUMOR REMOVAL      from back       Physical Exam  "    Patient Vitals for the past 24 hrs:   BP Temp Temp src Pulse Resp SpO2 Height Weight   05/25/25 0203 -- -- -- -- -- 96 % -- --   05/25/25 0202 117/78 -- -- 100 -- 99 % -- --   05/24/25 2130 126/82 98.5  F (36.9  C) Oral 101 18 97 % -- --   05/24/25 2129 -- -- -- -- -- -- 1.727 m (5' 8\") 86.2 kg (190 lb)     Physical Exam  Eyes:  Sclera white; Pupils are equal and round  ENT:    External ears and nares normal  CV:  Rate as above with regular rhythm   Resp:  Breath sounds clear and equal bilaterally    Non-labored, no retractions or accessory muscle use  GI:  Abdomen is soft, non-tender, non-distended    No rebound tenderness or peritoneal features  MS:  Moves all extremities  Skin:  Warm and dry, bruising left lateral check, left upper arm, abrasion left lateral malleolus at ankle  Neuro:  Speech is normal and fluent. No apparent deficit.      Diagnostics     Lab Results   Labs Ordered and Resulted from Time of ED Arrival to Time of ED Departure   BASIC METABOLIC PANEL - Abnormal       Result Value    Sodium 138      Potassium 3.8      Chloride 103      Carbon Dioxide (CO2) 21 (*)     Anion Gap 14      Urea Nitrogen 8.0      Creatinine 0.94      GFR Estimate 85      Calcium 8.8      Glucose 98     CBC WITH PLATELETS AND DIFFERENTIAL - Abnormal    WBC Count 15.8 (*)     RBC Count 4.82      Hemoglobin 14.2      Hematocrit 41.5      MCV 86      MCH 29.5      MCHC 34.2      RDW 12.4      Platelet Count 289      % Neutrophils 85      % Lymphocytes 11      % Monocytes 3      % Eosinophils 0      % Basophils 1      % Immature Granulocytes 0      NRBCs per 100 WBC 0      Absolute Neutrophils 13.5 (*)     Absolute Lymphocytes 1.7      Absolute Monocytes 0.5      Absolute Eosinophils 0.0      Absolute Basophils 0.1      Absolute Immature Granulocytes 0.1      Absolute NRBCs 0.0     D DIMER QUANTITATIVE - Abnormal    D-Dimer Quantitative 1.41 (*)    TROPONIN T, HIGH SENSITIVITY - Normal    Troponin T, High Sensitivity <6   "   HCG QUALITATIVE PREGNANCY - Normal    hCG Serum Qualitative Negative         Imaging   CT Chest Pulmonary Embolism w Contrast   Final Result   IMPRESSION:   1.  No infiltrate, pleural effusion or pulmonary embolism.   2.  Mild gallbladder distention.      Head CT w/o contrast   Final Result   IMPRESSION:   1.  No acute intracranial process or significant change since 5/13/2025.          EKG   ECG results from 05/24/25   EKG 12 lead     Value    Systolic Blood Pressure     Diastolic Blood Pressure     Ventricular Rate 107    Atrial Rate 107    WV Interval 142    QRS Duration 82        QTc 477    P Axis 73    R AXIS 73    T Axis 71    Interpretation ECG      Sinus tachycardia  Otherwise normal ECG  No previous ECGs available        Prior ECG 5/13/25 similar morphology, HR at that time was 98.  Per myself at 22:08.    Independent Interpretation   CT Head: No intracranial hemorrhage.    ED Course      Medications Administered   Medications   sodium chloride 0.9% BOLUS 1,000 mL (0 mLs Intravenous Stopped 5/25/25 0200)   iopamidol (ISOVUE-370) solution 70 mL (70 mLs Intravenous $Given 5/25/25 0057)   sodium chloride 0.9 % bag for CT scan flush (94 mLs Intravenous $Given 5/25/25 0057)       Procedures   Procedures     Discussion of Management   None    ED Course        Additional Documentation  None    Medical Decision Making / Diagnosis     CMS Diagnoses: None    MIPS   CT for PE was ordered because the patient had an abnormal d-dimer.     MDM   EKG w/o ischemia, dysrhythmia, or pericarditis.  No suggestion of Brugada or WPW.  By history this could have been syncopal or seizure in etiology.  Labs checked without critical electrolyte abnormalities, acute renal insufficiency, anemia contributing.  Tachycardic on arrival concerning for possible PE.  Dimer elevated.  CT did not show this pathology.  No abdominal tenderness to suggest abdominal imaging is needed today.  With her head/facial injury and vomiting  afterwards this can be seen with intracranial bleeding and concussion.  CT scan of the head fortunately did not show this pathology.  Given that etiology of events is still unclear as to primary neurologic (favored) or cardiac, outpatient heart monitor was ordered.  Keep neurology follow-up.  If continues to recur and any seizure like activity is noted then sooner neurology consultation to discuss whether to initiate anti-convulsant should be considered. A void activities for which recurrent event would be dangerous until the etiology is more clear.    Disposition   The patient was discharged.     Diagnosis     ICD-10-CM    1. Unresponsive episode  R40.4 ZIO PATCH MAIL OUT      2. Facial contusion, initial encounter  S00.83XA       3. Arm bruise, left, initial encounter  S40.022A       4. Sinus tachycardia  R00.0            Discharge Medications   Discharge Medication List as of 5/25/2025  2:00 AM               Jenna Crowe MD  05/25/25 1804

## 2025-06-02 ENCOUNTER — TELEPHONE (OUTPATIENT)
Dept: NEUROLOGY | Facility: CLINIC | Age: 25
End: 2025-06-02
Payer: COMMERCIAL

## 2025-06-02 NOTE — TELEPHONE ENCOUNTER
Left Voicemail (1st Attempt) and Sent Mychart (1st Attempt) for the patient to call back and schedule the following:    Appointment type: New Seizure  Provider: Any  Return date: next available  Specialty phone number: [phone]  Additional appointment(s) needed: NA  Additonal Notes:     Reschedule

## 2025-06-04 ENCOUNTER — TELEPHONE (OUTPATIENT)
Dept: NEUROLOGY | Facility: CLINIC | Age: 25
End: 2025-06-04
Payer: COMMERCIAL

## 2025-06-04 NOTE — TELEPHONE ENCOUNTER
Sent Mychart (1st Attempt) and Left Voicemail (2nd Attempt) for the patient to call back and schedule the following:    Appointment type: New Seizure  Provider: any  Return date: next available  Specialty phone number: [phone]  Additional appointment(s) needed: NA  Additonal Notes:

## 2025-07-08 ENCOUNTER — PRE VISIT (OUTPATIENT)
Dept: NEUROLOGY | Facility: CLINIC | Age: 25
End: 2025-07-08

## (undated) DEVICE — GLOVE PROTEXIS W/NEU-THERA 8.0  2D73TE80

## (undated) DEVICE — MAMMARY SUPPORT MED LATEX

## (undated) DEVICE — DRAIN JACKSON PRATT RESERVOIR 100ML SU130-1305

## (undated) DEVICE — PAD CHUX UNDERPAD 23X24" 7136

## (undated) DEVICE — SUCTION CANISTER MEDIVAC LINER 3000ML W/LID 65651-530

## (undated) DEVICE — PACK MAJOR SBA15MAFSI

## (undated) DEVICE — SOL NACL 0.9% IRRIG 1000ML BOTTLE 07138-09

## (undated) DEVICE — DRSG ABDOMINAL 12X16"

## (undated) DEVICE — PREP SKIN SCRUB TRAY 4461A

## (undated) DEVICE — DRSG STERI STRIP 1/2X4" R1547

## (undated) DEVICE — ESU PENCIL W/SMOKE EVAC E2515HS

## (undated) DEVICE — SU MONOCRYL 3-0 PS-2 27" Y427H

## (undated) DEVICE — SU MONOCRYL 4-0 PS-2 18" UND Y496G

## (undated) DEVICE — BNDG ELASTIC 6" DBL LENGTH UNSTERILE 6611-16

## (undated) DEVICE — DRSG GAUZE 4X4" 3033

## (undated) DEVICE — NDL 25GA 1.5" 305127

## (undated) DEVICE — SU VICRYL 2-0 FS-1 27" UND  J443H

## (undated) DEVICE — NDL SPINAL 22GA 3.5" QUINCKE 405181

## (undated) DEVICE — LINEN TOWEL PACK X5 5464

## (undated) DEVICE — GLOVE PROTEXIS W/NEU-THERA 6.5  2D73TE65

## (undated) DEVICE — DRAPE BREAST/CHEST 29420

## (undated) DEVICE — SOL WATER IRRIG 1000ML BOTTLE 2F7114

## (undated) DEVICE — GLOVE PROTEXIS BLUE W/NEU-THERA 6.0  2D73EB60

## (undated) DEVICE — SYR 10ML FINGER CONTROL W/O NDL 309695

## (undated) DEVICE — GLOVE PROTEXIS W/NEU-THERA 7.0  2D73TE70

## (undated) DEVICE — DRAIN JACKSON PRATT CHANNEL 19FR ROUND HUBLESS SIL JP-2230

## (undated) DEVICE — BLADE KNIFE SURG 10 371110

## (undated) RX ORDER — ONDANSETRON 2 MG/ML
INJECTION INTRAMUSCULAR; INTRAVENOUS
Status: DISPENSED
Start: 2017-07-11

## (undated) RX ORDER — HYDROMORPHONE HYDROCHLORIDE 1 MG/ML
INJECTION, SOLUTION INTRAMUSCULAR; INTRAVENOUS; SUBCUTANEOUS
Status: DISPENSED
Start: 2017-07-11

## (undated) RX ORDER — PROPOFOL 10 MG/ML
INJECTION, EMULSION INTRAVENOUS
Status: DISPENSED
Start: 2017-07-11

## (undated) RX ORDER — FENTANYL CITRATE 50 UG/ML
INJECTION, SOLUTION INTRAMUSCULAR; INTRAVENOUS
Status: DISPENSED
Start: 2017-07-11

## (undated) RX ORDER — DEXAMETHASONE SODIUM PHOSPHATE 4 MG/ML
INJECTION, SOLUTION INTRA-ARTICULAR; INTRALESIONAL; INTRAMUSCULAR; INTRAVENOUS; SOFT TISSUE
Status: DISPENSED
Start: 2017-07-11

## (undated) RX ORDER — LIDOCAINE HYDROCHLORIDE 20 MG/ML
INJECTION, SOLUTION EPIDURAL; INFILTRATION; INTRACAUDAL; PERINEURAL
Status: DISPENSED
Start: 2017-07-11

## (undated) RX ORDER — CEFAZOLIN SODIUM 2 G/100ML
INJECTION, SOLUTION INTRAVENOUS
Status: DISPENSED
Start: 2017-07-11

## (undated) RX ORDER — HYDROCODONE BITARTRATE AND ACETAMINOPHEN 5; 325 MG/1; MG/1
TABLET ORAL
Status: DISPENSED
Start: 2017-07-11

## (undated) RX ORDER — HYDROXYZINE HYDROCHLORIDE 25 MG/1
TABLET, FILM COATED ORAL
Status: DISPENSED
Start: 2017-07-11

## (undated) RX ORDER — BUPIVACAINE HYDROCHLORIDE AND EPINEPHRINE 2.5; 5 MG/ML; UG/ML
INJECTION, SOLUTION EPIDURAL; INFILTRATION; INTRACAUDAL; PERINEURAL
Status: DISPENSED
Start: 2017-07-11